# Patient Record
Sex: FEMALE | Race: WHITE | Employment: OTHER | URBAN - NONMETROPOLITAN AREA
[De-identification: names, ages, dates, MRNs, and addresses within clinical notes are randomized per-mention and may not be internally consistent; named-entity substitution may affect disease eponyms.]

---

## 2019-10-09 RX ORDER — METHYLPREDNISOLONE ACETATE 80 MG/ML
80 INJECTION, SUSPENSION INTRA-ARTICULAR; INTRALESIONAL; INTRAMUSCULAR; SOFT TISSUE ONCE
Status: CANCELLED | OUTPATIENT
Start: 2019-10-09 | End: 2019-10-09

## 2019-10-30 RX ORDER — METHYLPREDNISOLONE ACETATE 80 MG/ML
80 INJECTION, SUSPENSION INTRA-ARTICULAR; INTRALESIONAL; INTRAMUSCULAR; SOFT TISSUE ONCE
Status: CANCELLED | OUTPATIENT
Start: 2019-10-30 | End: 2019-10-30

## 2019-10-31 ENCOUNTER — HOSPITAL ENCOUNTER (OUTPATIENT)
Dept: INTERVENTIONAL RADIOLOGY/VASCULAR | Age: 64
Discharge: HOME | End: 2019-10-31
Payer: COMMERCIAL

## 2019-10-31 VITALS
DIASTOLIC BLOOD PRESSURE: 81 MMHG | OXYGEN SATURATION: 100 % | TEMPERATURE: 98.2 F | RESPIRATION RATE: 16 BRPM | HEART RATE: 73 BPM | WEIGHT: 123 LBS | SYSTOLIC BLOOD PRESSURE: 140 MMHG

## 2019-10-31 PROCEDURE — 6360000004 HC RX CONTRAST MEDICATION: Performed by: RADIOLOGY

## 2019-10-31 PROCEDURE — 2580000003 HC RX 258

## 2019-10-31 PROCEDURE — 62321 NJX INTERLAMINAR CRV/THRC: CPT | Performed by: RADIOLOGY

## 2019-10-31 PROCEDURE — 2500000003 HC RX 250 WO HCPCS

## 2019-10-31 PROCEDURE — 6360000002 HC RX W HCPCS

## 2019-10-31 PROCEDURE — 2709999900 HC NON-CHARGEABLE SUPPLY

## 2019-10-31 RX ORDER — BUSPIRONE HYDROCHLORIDE 10 MG/1
7.5 TABLET ORAL 2 TIMES DAILY
COMMUNITY

## 2019-10-31 RX ORDER — THIAMINE MONONITRATE (VIT B1) 100 MG
100 TABLET ORAL DAILY
COMMUNITY

## 2019-10-31 RX ORDER — CYCLOBENZAPRINE HCL 10 MG
10 TABLET ORAL 3 TIMES DAILY PRN
COMMUNITY

## 2019-10-31 RX ORDER — SUMATRIPTAN 6 MG/.5ML
6 INJECTION, SOLUTION SUBCUTANEOUS
COMMUNITY

## 2019-10-31 RX ORDER — METHYLPREDNISOLONE ACETATE 80 MG/ML
80 INJECTION, SUSPENSION INTRA-ARTICULAR; INTRALESIONAL; INTRAMUSCULAR; SOFT TISSUE ONCE
Status: COMPLETED | OUTPATIENT
Start: 2019-10-31 | End: 2019-10-31

## 2019-10-31 RX ORDER — TRAZODONE HYDROCHLORIDE 50 MG/1
50 TABLET ORAL NIGHTLY
COMMUNITY

## 2019-10-31 RX ORDER — M-VIT,TX,IRON,MINS/CALC/FOLIC 27MG-0.4MG
1 TABLET ORAL DAILY
COMMUNITY

## 2019-10-31 RX ORDER — FLUTICASONE PROPIONATE 50 MCG
1 SPRAY, SUSPENSION (ML) NASAL DAILY
COMMUNITY

## 2019-10-31 RX ORDER — OXYBUTYNIN CHLORIDE 15 MG/1
15 TABLET, EXTENDED RELEASE ORAL DAILY
COMMUNITY

## 2019-10-31 RX ORDER — TRAMADOL HYDROCHLORIDE 50 MG/1
50 TABLET ORAL EVERY 8 HOURS PRN
COMMUNITY

## 2019-10-31 RX ORDER — IBUPROFEN 600 MG/1
600 TABLET ORAL EVERY 8 HOURS PRN
COMMUNITY

## 2019-10-31 RX ORDER — CALCIUM CARBONATE 500(1250)
600 TABLET ORAL 2 TIMES DAILY
COMMUNITY

## 2019-10-31 RX ADMIN — IOHEXOL 1 ML: 180 INJECTION INTRAVENOUS at 13:24

## 2019-10-31 RX ADMIN — METHYLPREDNISOLONE ACETATE 80 MG: 80 INJECTION, SUSPENSION INTRA-ARTICULAR; INTRALESIONAL; INTRAMUSCULAR; SOFT TISSUE at 13:25

## 2019-10-31 RX ADMIN — Medication 1 ML: at 13:25

## 2019-10-31 ASSESSMENT — PAIN - FUNCTIONAL ASSESSMENT: PAIN_FUNCTIONAL_ASSESSMENT: 0-10

## 2019-10-31 ASSESSMENT — PAIN DESCRIPTION - ORIENTATION: ORIENTATION: LOWER;MID

## 2019-10-31 ASSESSMENT — PAIN DESCRIPTION - LOCATION: LOCATION: BACK

## 2019-10-31 ASSESSMENT — PAIN DESCRIPTION - PAIN TYPE: TYPE: CHRONIC PAIN

## 2019-10-31 ASSESSMENT — PAIN SCALES - GENERAL: PAINLEVEL_OUTOF10: 7

## 2020-10-07 RX ORDER — DEXAMETHASONE SODIUM PHOSPHATE 4 MG/ML
4 INJECTION, SOLUTION INTRA-ARTICULAR; INTRALESIONAL; INTRAMUSCULAR; INTRAVENOUS; SOFT TISSUE ONCE
Status: CANCELLED | OUTPATIENT
Start: 2020-10-07 | End: 2020-10-07

## 2020-10-08 ENCOUNTER — HOSPITAL ENCOUNTER (OUTPATIENT)
Dept: INTERVENTIONAL RADIOLOGY/VASCULAR | Age: 65
Discharge: HOME | End: 2020-10-08
Payer: MEDICARE

## 2020-10-08 VITALS
DIASTOLIC BLOOD PRESSURE: 65 MMHG | SYSTOLIC BLOOD PRESSURE: 127 MMHG | WEIGHT: 125.8 LBS | RESPIRATION RATE: 16 BRPM | HEART RATE: 67 BPM | TEMPERATURE: 97.6 F | OXYGEN SATURATION: 97 %

## 2020-10-08 PROCEDURE — 2709999900 HC NON-CHARGEABLE SUPPLY

## 2020-10-08 PROCEDURE — 6360000002 HC RX W HCPCS

## 2020-10-08 PROCEDURE — 6360000004 HC RX CONTRAST MEDICATION: Performed by: RADIOLOGY

## 2020-10-08 PROCEDURE — 2580000003 HC RX 258

## 2020-10-08 PROCEDURE — 62321 NJX INTERLAMINAR CRV/THRC: CPT | Performed by: RADIOLOGY

## 2020-10-08 RX ORDER — ASPIRIN 81 MG/1
81 TABLET ORAL DAILY
COMMUNITY

## 2020-10-08 RX ORDER — DEXAMETHASONE SODIUM PHOSPHATE 4 MG/ML
4 INJECTION, SOLUTION INTRA-ARTICULAR; INTRALESIONAL; INTRAMUSCULAR; INTRAVENOUS; SOFT TISSUE ONCE
Status: COMPLETED | OUTPATIENT
Start: 2020-10-08 | End: 2020-10-08

## 2020-10-08 RX ORDER — PREGABALIN 50 MG/1
50 CAPSULE ORAL 2 TIMES DAILY
COMMUNITY

## 2020-10-08 RX ADMIN — Medication 1 ML: at 13:46

## 2020-10-08 RX ADMIN — IOHEXOL 1 ML: 180 INJECTION INTRAVENOUS at 13:45

## 2020-10-08 RX ADMIN — DEXAMETHASONE SODIUM PHOSPHATE 4 MG: 4 INJECTION, SOLUTION INTRA-ARTICULAR; INTRALESIONAL; INTRAMUSCULAR; INTRAVENOUS; SOFT TISSUE at 13:46

## 2020-10-08 ASSESSMENT — PAIN DESCRIPTION - LOCATION: LOCATION: BACK;KNEE

## 2020-10-08 ASSESSMENT — PAIN SCALES - GENERAL
PAINLEVEL_OUTOF10: 2
PAINLEVEL_OUTOF10: 0
PAINLEVEL_OUTOF10: 5
PAINLEVEL_OUTOF10: 2

## 2020-10-08 ASSESSMENT — PAIN - FUNCTIONAL ASSESSMENT: PAIN_FUNCTIONAL_ASSESSMENT: 0-10

## 2020-10-08 ASSESSMENT — PAIN DESCRIPTION - ORIENTATION: ORIENTATION: LEFT

## 2020-10-08 NOTE — PROGRESS NOTES
1245  Pt ambulated into room with cane for nerve block cervical. Pt rights and responsibilities offered to patient and . Patient has been NPO for 4 hours and patient has held her aspirin for 5 days. 1357 pt arrived back to [facility] states no new pain, just the same pain she had. No new numbness nor tingling. Pt offered snack and beverate      1415 pt has no changes and no needs at this time  at bedside and is patient's . 1430  Patient ambulated out for discharge with her cane, discharge instructions explained, pt tolerated procedure well and verbalized understanding. No new needs at this time.                      __m__ Safety:       (Environmental)   [place] to environment   Ensure ID band is correct and in place/ allergy band as needed   Assess for fall risk   Initiate fall precautions as applicable (fall band, side rails, etc.)   Call light within reach   Bed in low position/ wheels locked    _m___ Pain:        Assess pain level and characteristics   Administer analgesics as ordered   Assess effectiveness of pain management and report to MD as needed    __m__ Knowledge Deficit:   Assess baseline knowledge   Provide teaching at level of understanding   Provide teaching via preferred learning method   Evaluate teaching effectiveness    _m___ Hemodynamic/Respiratory Status:       (Pre and Post Procedure Monitoring)   Assess/Monitor vital signs and LOC   Assess Baseline SpO2 prior to any sedation   Obtain weight/height   Assess vital signs/ LOC until patient meets discharge criteria   Monitor procedure site and notify MD of any issues

## 2020-10-08 NOTE — H&P
Formulation and discussion of sedation / procedure plans, risks, benefits, side effects and alternatives with patient and/or responsible adult completed.     Electronically signed by Mali Harris MD on 10/8/2020 at 1:47 PM

## 2020-10-08 NOTE — OP NOTE
Department of Radiology  Post Procedure Progress Note      Pre-Procedure Diagnosis:  Cervical radiculopathy     Procedure Performed:  Epidural block/steroid injection      Anesthesia: local     Findings: successful    Immediate Complications:  None    Estimated Blood Loss: minimal    SEE DICTATED PROCEDURE NOTE FOR COMPLETE DETAILS.       Katherine Smith MD   10/8/2020 1:47 PM

## 2024-03-20 RX ORDER — OMEGA-3S/DHA/EPA/FISH OIL/D3 300MG-1000
400 CAPSULE ORAL DAILY
COMMUNITY

## 2024-03-20 RX ORDER — HYDROCODONE BITARTRATE AND ACETAMINOPHEN 5; 325 MG/1; MG/1
1 TABLET ORAL EVERY 6 HOURS PRN
Status: ON HOLD | COMMUNITY
Start: 2024-03-16 | End: 2024-03-21 | Stop reason: HOSPADM

## 2024-03-20 RX ORDER — PRAVASTATIN SODIUM 40 MG
40 TABLET ORAL NIGHTLY
COMMUNITY
Start: 2020-05-27

## 2024-03-20 RX ORDER — CELECOXIB 100 MG/1
100 CAPSULE ORAL 2 TIMES DAILY
COMMUNITY
Start: 2024-01-29 | End: 2024-04-28

## 2024-03-20 RX ORDER — DOXYCYCLINE HYCLATE 50 MG/1
30 CAPSULE ORAL DAILY
COMMUNITY
Start: 2015-03-31

## 2024-03-20 RX ORDER — CALCIUM CARBONATE 300MG(750)
400 TABLET,CHEWABLE ORAL NIGHTLY
COMMUNITY
Start: 2023-07-31

## 2024-03-20 RX ORDER — CLONAZEPAM 0.5 MG/1
0.5 TABLET, ORALLY DISINTEGRATING ORAL 3 TIMES DAILY PRN
COMMUNITY
Start: 2023-10-25

## 2024-03-20 NOTE — PROGRESS NOTES
Left message for CHLOE Zheng of Dr. Das - requesting for all pre-op testing results be faxed to PAT.  Await response.

## 2024-03-20 NOTE — PROGRESS NOTES
Left message for ANUPAMA [facility] Records - requesting for all pre-op testing results to be faxed to PAT.  Await response.

## 2024-03-21 ENCOUNTER — HOSPITAL ENCOUNTER (OUTPATIENT)
Age: 69
Discharge: HOME | End: 2024-03-22
Attending: ORTHOPAEDIC SURGERY | Admitting: ORTHOPAEDIC SURGERY
Payer: MEDICARE

## 2024-03-21 ENCOUNTER — APPOINTMENT (OUTPATIENT)
Dept: GENERAL RADIOLOGY | Age: 69
End: 2024-03-21
Attending: ORTHOPAEDIC SURGERY
Payer: MEDICARE

## 2024-03-21 ENCOUNTER — ANESTHESIA EVENT (OUTPATIENT)
Dept: OPERATING ROOM | Age: 69
End: 2024-03-21
Payer: MEDICARE

## 2024-03-21 ENCOUNTER — ANESTHESIA (OUTPATIENT)
Dept: OPERATING ROOM | Age: 69
End: 2024-03-21
Payer: MEDICARE

## 2024-03-21 DIAGNOSIS — G89.18 POSTOPERATIVE PAIN: Primary | ICD-10-CM

## 2024-03-21 PROBLEM — S42.402A CLOSED FRACTURE OF DISTAL END OF LEFT HUMERUS, UNSPECIFIED FRACTURE MORPHOLOGY, INITIAL ENCOUNTER: Status: ACTIVE | Noted: 2024-03-21

## 2024-03-21 PROCEDURE — 2500000003 HC RX 250 WO HCPCS: Performed by: NURSE ANESTHETIST, CERTIFIED REGISTERED

## 2024-03-21 PROCEDURE — 6360000002 HC RX W HCPCS: Performed by: NURSE ANESTHETIST, CERTIFIED REGISTERED

## 2024-03-21 PROCEDURE — 2580000003 HC RX 258: Performed by: PHYSICIAN ASSISTANT

## 2024-03-21 PROCEDURE — 3600000014 HC SURGERY LEVEL 4 ADDTL 15MIN: Performed by: ORTHOPAEDIC SURGERY

## 2024-03-21 PROCEDURE — 7100000000 HC PACU RECOVERY - FIRST 15 MIN: Performed by: ORTHOPAEDIC SURGERY

## 2024-03-21 PROCEDURE — 2709999900 HC NON-CHARGEABLE SUPPLY: Performed by: ORTHOPAEDIC SURGERY

## 2024-03-21 PROCEDURE — 2720000010 HC SURG SUPPLY STERILE: Performed by: ORTHOPAEDIC SURGERY

## 2024-03-21 PROCEDURE — 73060 X-RAY EXAM OF HUMERUS: CPT

## 2024-03-21 PROCEDURE — 2780000010 HC IMPLANT OTHER: Performed by: ORTHOPAEDIC SURGERY

## 2024-03-21 PROCEDURE — 3600000004 HC SURGERY LEVEL 4 BASE: Performed by: ORTHOPAEDIC SURGERY

## 2024-03-21 PROCEDURE — 2580000003 HC RX 258: Performed by: NURSE ANESTHETIST, CERTIFIED REGISTERED

## 2024-03-21 PROCEDURE — 64415 NJX AA&/STRD BRCH PLXS IMG: CPT | Performed by: ANESTHESIOLOGY

## 2024-03-21 PROCEDURE — 6360000002 HC RX W HCPCS: Performed by: PHYSICIAN ASSISTANT

## 2024-03-21 PROCEDURE — 6360000002 HC RX W HCPCS: Performed by: ANESTHESIOLOGY

## 2024-03-21 PROCEDURE — 3700000000 HC ANESTHESIA ATTENDED CARE: Performed by: ORTHOPAEDIC SURGERY

## 2024-03-21 PROCEDURE — C1713 ANCHOR/SCREW BN/BN,TIS/BN: HCPCS | Performed by: ORTHOPAEDIC SURGERY

## 2024-03-21 PROCEDURE — 7100000001 HC PACU RECOVERY - ADDTL 15 MIN: Performed by: ORTHOPAEDIC SURGERY

## 2024-03-21 PROCEDURE — 3700000001 HC ADD 15 MINUTES (ANESTHESIA): Performed by: ORTHOPAEDIC SURGERY

## 2024-03-21 DEVICE — EVOS 2.7MM X 34MM LOCKING SCREW T8 SELF-TAPPING
Type: IMPLANTABLE DEVICE | Site: ARM | Status: FUNCTIONAL
Brand: EVOS

## 2024-03-21 DEVICE — EVOS 3.5MM X 18MM CORTEX SCREW SELF-TAPPING
Type: IMPLANTABLE DEVICE | Site: ARM | Status: FUNCTIONAL
Brand: EVOS

## 2024-03-21 DEVICE — EVOS 3.5MM X 20MM CORTEX SCREW SELF-TAPPING
Type: IMPLANTABLE DEVICE | Site: ARM | Status: FUNCTIONAL
Brand: EVOS

## 2024-03-21 DEVICE — EVOS 2.7MM X 55MM LOCKING SCREW T8 SELF-TAPPING
Type: IMPLANTABLE DEVICE | Site: ARM | Status: FUNCTIONAL
Brand: EVOS

## 2024-03-21 DEVICE — EVOS 2.7MM X 46MM LOCKING SCREW T8 SELF-TAPPING
Type: IMPLANTABLE DEVICE | Site: ARM | Status: FUNCTIONAL
Brand: EVOS

## 2024-03-21 DEVICE — EVOS 4.7MM X 28MM OSTEOPENIA SCREW                                    FULLY THREADED
Type: IMPLANTABLE DEVICE | Site: ARM | Status: FUNCTIONAL
Brand: EVOS

## 2024-03-21 DEVICE — PERI-LOC K-WIRE 1.6MM X 150MM                                    LENGTH TROCAR POINT
Type: IMPLANTABLE DEVICE | Site: ARM | Status: FUNCTIONAL
Brand: PERI-LOC

## 2024-03-21 DEVICE — EVOS 3.5MM X 30MM LOCKING SCREW SELF-TAPPING
Type: IMPLANTABLE DEVICE | Site: ARM | Status: FUNCTIONAL
Brand: EVOS

## 2024-03-21 DEVICE — EVOS 3.5MM X 22MM LOCKING SCREW SELF-TAPPING
Type: IMPLANTABLE DEVICE | Site: ARM | Status: FUNCTIONAL
Brand: EVOS

## 2024-03-21 DEVICE — EVOS 4.0MM X 28MM OSTEOPENIA SCREW                                    T8 FULLY THREADED
Type: IMPLANTABLE DEVICE | Site: ARM | Status: FUNCTIONAL
Brand: EVOS

## 2024-03-21 DEVICE — EVOS 2.7MM X 42MM LOCKING SCREW T8 SELF-TAPPING
Type: IMPLANTABLE DEVICE | Site: ARM | Status: FUNCTIONAL
Brand: EVOS

## 2024-03-21 DEVICE — PERI-LOC K-WIRE 2.0MM X 150MM                                    LENGTH TROCAR POINT
Type: IMPLANTABLE DEVICE | Site: ARM | Status: FUNCTIONAL
Brand: PERI-LOC

## 2024-03-21 DEVICE — EVOS 2.7MM X 38MM LOCKING SCREW T8 SELF-TAPPING
Type: IMPLANTABLE DEVICE | Site: ARM | Status: FUNCTIONAL
Brand: EVOS

## 2024-03-21 DEVICE — EVOS 3.5MM X 20MM LOCKING SCREW SELF-TAPPING
Type: IMPLANTABLE DEVICE | Site: ARM | Status: FUNCTIONAL
Brand: EVOS

## 2024-03-21 DEVICE — EVOS 3.5MM X 18MM LOCKING SCREW SELF-TAPPING
Type: IMPLANTABLE DEVICE | Site: ARM | Status: FUNCTIONAL
Brand: EVOS

## 2024-03-21 DEVICE — EVOS 3.5MM X 24MM LOCKING SCREW SELF-TAPPING
Type: IMPLANTABLE DEVICE | Site: ARM | Status: FUNCTIONAL
Brand: EVOS

## 2024-03-21 RX ORDER — SODIUM CHLORIDE 0.9 % (FLUSH) 0.9 %
5-40 SYRINGE (ML) INJECTION EVERY 12 HOURS SCHEDULED
Status: DISCONTINUED | OUTPATIENT
Start: 2024-03-21 | End: 2024-03-22 | Stop reason: HOSPADM

## 2024-03-21 RX ORDER — SODIUM CHLORIDE 9 MG/ML
INJECTION, SOLUTION INTRAVENOUS PRN
Status: DISCONTINUED | OUTPATIENT
Start: 2024-03-21 | End: 2024-03-22 | Stop reason: HOSPADM

## 2024-03-21 RX ORDER — DEXAMETHASONE SODIUM PHOSPHATE 4 MG/ML
INJECTION, SOLUTION INTRA-ARTICULAR; INTRALESIONAL; INTRAMUSCULAR; INTRAVENOUS; SOFT TISSUE
Status: COMPLETED | OUTPATIENT
Start: 2024-03-21 | End: 2024-03-21

## 2024-03-21 RX ORDER — FLUTICASONE PROPIONATE 50 MCG
1 SPRAY, SUSPENSION (ML) NASAL DAILY
Status: DISCONTINUED | OUTPATIENT
Start: 2024-03-22 | End: 2024-03-22 | Stop reason: HOSPADM

## 2024-03-21 RX ORDER — HYDROCODONE BITARTRATE AND ACETAMINOPHEN 5; 325 MG/1; MG/1
1 TABLET ORAL EVERY 4 HOURS PRN
Qty: 30 TABLET | Refills: 0 | Status: SHIPPED | OUTPATIENT
Start: 2024-03-21 | End: 2024-03-26

## 2024-03-21 RX ORDER — PRAVASTATIN SODIUM 40 MG
40 TABLET ORAL NIGHTLY
Status: DISCONTINUED | OUTPATIENT
Start: 2024-03-21 | End: 2024-03-22 | Stop reason: HOSPADM

## 2024-03-21 RX ORDER — ONDANSETRON 4 MG/1
4 TABLET, ORALLY DISINTEGRATING ORAL EVERY 8 HOURS PRN
Status: DISCONTINUED | OUTPATIENT
Start: 2024-03-21 | End: 2024-03-22 | Stop reason: HOSPADM

## 2024-03-21 RX ORDER — FENTANYL CITRATE 50 UG/ML
INJECTION, SOLUTION INTRAMUSCULAR; INTRAVENOUS PRN
Status: DISCONTINUED | OUTPATIENT
Start: 2024-03-21 | End: 2024-03-21 | Stop reason: SDUPTHER

## 2024-03-21 RX ORDER — FENTANYL CITRATE 50 UG/ML
50 INJECTION, SOLUTION INTRAMUSCULAR; INTRAVENOUS EVERY 5 MIN PRN
Status: DISCONTINUED | OUTPATIENT
Start: 2024-03-21 | End: 2024-03-21 | Stop reason: HOSPADM

## 2024-03-21 RX ORDER — FENTANYL CITRATE 50 UG/ML
25 INJECTION, SOLUTION INTRAMUSCULAR; INTRAVENOUS EVERY 5 MIN PRN
Status: DISCONTINUED | OUTPATIENT
Start: 2024-03-21 | End: 2024-03-21 | Stop reason: HOSPADM

## 2024-03-21 RX ORDER — CELECOXIB 100 MG/1
100 CAPSULE ORAL 2 TIMES DAILY
Status: DISCONTINUED | OUTPATIENT
Start: 2024-03-21 | End: 2024-03-22 | Stop reason: HOSPADM

## 2024-03-21 RX ORDER — ROCURONIUM BROMIDE 10 MG/ML
INJECTION, SOLUTION INTRAVENOUS PRN
Status: DISCONTINUED | OUTPATIENT
Start: 2024-03-21 | End: 2024-03-21 | Stop reason: SDUPTHER

## 2024-03-21 RX ORDER — MULTIVITAMIN WITH IRON
1 TABLET ORAL DAILY
Status: DISCONTINUED | OUTPATIENT
Start: 2024-03-21 | End: 2024-03-22 | Stop reason: HOSPADM

## 2024-03-21 RX ORDER — HYDROCODONE BITARTRATE AND ACETAMINOPHEN 5; 325 MG/1; MG/1
2 TABLET ORAL EVERY 4 HOURS PRN
Status: DISCONTINUED | OUTPATIENT
Start: 2024-03-21 | End: 2024-03-22 | Stop reason: HOSPADM

## 2024-03-21 RX ORDER — CLONAZEPAM 0.5 MG/1
0.5 TABLET ORAL 3 TIMES DAILY PRN
Status: DISCONTINUED | OUTPATIENT
Start: 2024-03-21 | End: 2024-03-22 | Stop reason: HOSPADM

## 2024-03-21 RX ORDER — SODIUM CHLORIDE 0.9 % (FLUSH) 0.9 %
5-40 SYRINGE (ML) INJECTION PRN
Status: DISCONTINUED | OUTPATIENT
Start: 2024-03-21 | End: 2024-03-21 | Stop reason: HOSPADM

## 2024-03-21 RX ORDER — ONDANSETRON 2 MG/ML
INJECTION INTRAMUSCULAR; INTRAVENOUS PRN
Status: DISCONTINUED | OUTPATIENT
Start: 2024-03-21 | End: 2024-03-21 | Stop reason: SDUPTHER

## 2024-03-21 RX ORDER — ONDANSETRON 2 MG/ML
4 INJECTION INTRAMUSCULAR; INTRAVENOUS EVERY 6 HOURS PRN
Status: DISCONTINUED | OUTPATIENT
Start: 2024-03-21 | End: 2024-03-22 | Stop reason: HOSPADM

## 2024-03-21 RX ORDER — SODIUM CHLORIDE 9 MG/ML
INJECTION, SOLUTION INTRAVENOUS PRN
Status: DISCONTINUED | OUTPATIENT
Start: 2024-03-21 | End: 2024-03-21 | Stop reason: HOSPADM

## 2024-03-21 RX ORDER — PROPOFOL 10 MG/ML
INJECTION, EMULSION INTRAVENOUS PRN
Status: DISCONTINUED | OUTPATIENT
Start: 2024-03-21 | End: 2024-03-21 | Stop reason: SDUPTHER

## 2024-03-21 RX ORDER — SODIUM CHLORIDE 9 MG/ML
INJECTION, SOLUTION INTRAVENOUS CONTINUOUS PRN
Status: DISCONTINUED | OUTPATIENT
Start: 2024-03-21 | End: 2024-03-21 | Stop reason: SDUPTHER

## 2024-03-21 RX ORDER — HYDROCODONE BITARTRATE AND ACETAMINOPHEN 5; 325 MG/1; MG/1
1 TABLET ORAL EVERY 4 HOURS PRN
Status: DISCONTINUED | OUTPATIENT
Start: 2024-03-21 | End: 2024-03-22 | Stop reason: HOSPADM

## 2024-03-21 RX ORDER — SODIUM CHLORIDE 0.9 % (FLUSH) 0.9 %
5-40 SYRINGE (ML) INJECTION EVERY 12 HOURS SCHEDULED
Status: DISCONTINUED | OUTPATIENT
Start: 2024-03-21 | End: 2024-03-21 | Stop reason: HOSPADM

## 2024-03-21 RX ORDER — TRAZODONE HYDROCHLORIDE 50 MG/1
50 TABLET ORAL NIGHTLY
Status: DISCONTINUED | OUTPATIENT
Start: 2024-03-21 | End: 2024-03-22 | Stop reason: HOSPADM

## 2024-03-21 RX ORDER — DEXAMETHASONE SODIUM PHOSPHATE 10 MG/ML
INJECTION, EMULSION INTRAMUSCULAR; INTRAVENOUS PRN
Status: DISCONTINUED | OUTPATIENT
Start: 2024-03-21 | End: 2024-03-21 | Stop reason: SDUPTHER

## 2024-03-21 RX ORDER — SODIUM CHLORIDE 9 MG/ML
INJECTION, SOLUTION INTRAVENOUS CONTINUOUS
Status: DISCONTINUED | OUTPATIENT
Start: 2024-03-21 | End: 2024-03-21 | Stop reason: HOSPADM

## 2024-03-21 RX ORDER — LANOLIN ALCOHOL/MO/W.PET/CERES
400 CREAM (GRAM) TOPICAL NIGHTLY
Status: DISCONTINUED | OUTPATIENT
Start: 2024-03-21 | End: 2024-03-22 | Stop reason: HOSPADM

## 2024-03-21 RX ORDER — LIDOCAINE HCL/PF 100 MG/5ML
SYRINGE (ML) INJECTION PRN
Status: DISCONTINUED | OUTPATIENT
Start: 2024-03-21 | End: 2024-03-21 | Stop reason: SDUPTHER

## 2024-03-21 RX ORDER — BUPIVACAINE HYDROCHLORIDE 5 MG/ML
INJECTION, SOLUTION EPIDURAL; INTRACAUDAL
Status: COMPLETED | OUTPATIENT
Start: 2024-03-21 | End: 2024-03-21

## 2024-03-21 RX ORDER — MIDAZOLAM HYDROCHLORIDE 1 MG/ML
INJECTION INTRAMUSCULAR; INTRAVENOUS PRN
Status: DISCONTINUED | OUTPATIENT
Start: 2024-03-21 | End: 2024-03-21 | Stop reason: SDUPTHER

## 2024-03-21 RX ORDER — SODIUM CHLORIDE 0.9 % (FLUSH) 0.9 %
5-40 SYRINGE (ML) INJECTION PRN
Status: DISCONTINUED | OUTPATIENT
Start: 2024-03-21 | End: 2024-03-22 | Stop reason: HOSPADM

## 2024-03-21 RX ORDER — DULOXETIN HYDROCHLORIDE 30 MG/1
30 CAPSULE, DELAYED RELEASE ORAL DAILY
COMMUNITY

## 2024-03-21 RX ORDER — TRANEXAMIC ACID 100 MG/ML
INJECTION, SOLUTION INTRAVENOUS PRN
Status: DISCONTINUED | OUTPATIENT
Start: 2024-03-21 | End: 2024-03-21 | Stop reason: SDUPTHER

## 2024-03-21 RX ORDER — NALOXONE HYDROCHLORIDE 0.4 MG/ML
INJECTION, SOLUTION INTRAMUSCULAR; INTRAVENOUS; SUBCUTANEOUS PRN
Status: DISCONTINUED | OUTPATIENT
Start: 2024-03-21 | End: 2024-03-21 | Stop reason: HOSPADM

## 2024-03-21 RX ADMIN — DEXAMETHASONE SODIUM PHOSPHATE 10 MG: 4 INJECTION, SOLUTION INTRA-ARTICULAR; INTRALESIONAL; INTRAMUSCULAR; INTRAVENOUS; SOFT TISSUE at 13:40

## 2024-03-21 RX ADMIN — ROCURONIUM BROMIDE 50 MG: 10 INJECTION INTRAVENOUS at 14:52

## 2024-03-21 RX ADMIN — SUGAMMADEX 200 MG: 100 INJECTION, SOLUTION INTRAVENOUS at 18:50

## 2024-03-21 RX ADMIN — ONDANSETRON 4 MG: 2 INJECTION INTRAMUSCULAR; INTRAVENOUS at 14:52

## 2024-03-21 RX ADMIN — FENTANYL CITRATE 50 MCG: 50 INJECTION, SOLUTION INTRAMUSCULAR; INTRAVENOUS at 18:17

## 2024-03-21 RX ADMIN — WATER 2000 MG: 1 INJECTION INTRAMUSCULAR; INTRAVENOUS; SUBCUTANEOUS at 22:39

## 2024-03-21 RX ADMIN — WATER 2000 MG: 1 INJECTION INTRAMUSCULAR; INTRAVENOUS; SUBCUTANEOUS at 15:00

## 2024-03-21 RX ADMIN — MIDAZOLAM 2 MG: 1 INJECTION INTRAMUSCULAR; INTRAVENOUS at 13:40

## 2024-03-21 RX ADMIN — SODIUM CHLORIDE: 9 INJECTION, SOLUTION INTRAVENOUS at 18:14

## 2024-03-21 RX ADMIN — FENTANYL CITRATE 50 MCG: 50 INJECTION, SOLUTION INTRAMUSCULAR; INTRAVENOUS at 18:24

## 2024-03-21 RX ADMIN — SODIUM CHLORIDE: 9 INJECTION, SOLUTION INTRAVENOUS at 14:48

## 2024-03-21 RX ADMIN — PROPOFOL 200 MG: 10 INJECTION, EMULSION INTRAVENOUS at 14:52

## 2024-03-21 RX ADMIN — SODIUM CHLORIDE: 9 INJECTION, SOLUTION INTRAVENOUS at 16:06

## 2024-03-21 RX ADMIN — ROCURONIUM BROMIDE 20 MG: 10 INJECTION INTRAVENOUS at 17:41

## 2024-03-21 RX ADMIN — ONDANSETRON 4 MG: 2 INJECTION INTRAMUSCULAR; INTRAVENOUS at 20:17

## 2024-03-21 RX ADMIN — FENTANYL CITRATE 100 MCG: 50 INJECTION, SOLUTION INTRAMUSCULAR; INTRAVENOUS at 14:52

## 2024-03-21 RX ADMIN — DEXAMETHASONE SODIUM PHOSPHATE 10 MG: 10 INJECTION, EMULSION INTRAMUSCULAR; INTRAVENOUS at 14:52

## 2024-03-21 RX ADMIN — TRANEXAMIC ACID 1000 MG: 100 INJECTION, SOLUTION INTRAVENOUS at 17:35

## 2024-03-21 RX ADMIN — Medication 100 MG: at 14:52

## 2024-03-21 RX ADMIN — BUPIVACAINE HYDROCHLORIDE 30 ML: 5 INJECTION, SOLUTION EPIDURAL; INTRACAUDAL; PERINEURAL at 13:40

## 2024-03-21 RX ADMIN — SODIUM CHLORIDE: 9 INJECTION, SOLUTION INTRAVENOUS at 13:13

## 2024-03-21 ASSESSMENT — PAIN - FUNCTIONAL ASSESSMENT
PAIN_FUNCTIONAL_ASSESSMENT: 0-10
PAIN_FUNCTIONAL_ASSESSMENT: NONE - DENIES PAIN

## 2024-03-21 NOTE — PROGRESS NOTES
1335: Patient transported to PACU for left upper extremity block.   Block to be performed by Dr. Anatoly Cardoza.   Persons in addition to physician assisting in block: Hannah Antoine RN  Consent confirmed complete and signed by patient, anesthesia provider and a RN.     Time out performed and documented in Time Out section under the PACU tab.  Block started at: *1340  Block completed at: 1345    Medications administered during block procedure: Versed    Pre, intra, and post-block procedure vitals charted under the flowsheet.     Patient accompanied and monitored by this PACU RN until care was assumed by the OR team.

## 2024-03-21 NOTE — ANESTHESIA PROCEDURE NOTES
Peripheral Block    Patient location during procedure: procedure area  Reason for block: at surgeon's request  Start time: 3/21/2024 1:40 PM  End time: 3/21/2024 1:45 PM  Staffing  Performed: anesthesiologist   Anesthesiologist: Anatoly Cardoza DO  Performed by: Anatoly Cardoza DO  Authorized by: Anatoly Cardoza DO    Preanesthetic Checklist  Completed: patient identified, IV checked, site marked, risks and benefits discussed, surgical/procedural consents, equipment checked, pre-op evaluation, timeout performed, anesthesia consent given, oxygen available, monitors applied/VS acknowledged, fire risk safety assessment completed and verbalized and blood product R/B/A discussed and consented  Peripheral Block   Patient position: sitting  Prep: ChloraPrep  Provider prep: mask and sterile gloves  Patient monitoring: IV access, responsive to questions and continuous pulse ox  Block type: Brachial plexus  Supraclavicular  Laterality: left  Injection technique: single-shot  Guidance: ultrasound guided    Needle   Needle type: short-bevel   Needle gauge: 20 G  Needle localization: ultrasound guidance  Needle length: 10 cm  Assessment   Injection assessment: negative aspiration for heme and local visualized surrounding nerve on ultrasound  Paresthesia pain: immediately resolved  Slow fractionated injection: yes  Hemodynamics: stable  Outcomes: patient tolerated procedure well    Medications Administered  BUPivacaine (MARCAINE) PF injection 0.5% - Perineural   30 mL - 3/21/2024 1:40:00 PM  dexAMETHasone (DECADRON) injection 4 mg/mL - Perineural   10 mg - 3/21/2024 1:40:00 PM

## 2024-03-21 NOTE — PROGRESS NOTES
1920: Pt arrives to pacu, awakens to verbal stimuli. Pt on room air, respirations easy and unlabored. Currently denies pain or nausea. VSS  1935: Pt resting off and on with eyes closed, easily awakens to voice. Continues to deny pain.  1940: Report given to Leticia on 6A  1950: Pt meets criteria for discharge from pacu, awaiting transport  1952:Pt transported to 6A in stable condition. VSS

## 2024-03-21 NOTE — DISCHARGE INSTRUCTIONS
Orthopedic Discharge Instructions:  Weight bearing status: No lifting, pushing or pulling for the left arm. Keep dressing clean and dry.  Do not remove dressings or splint  Ice (20 minutes on and off 1 hour) and elevate as needed to reduce swelling and throbbing pain.  If Dressing allowed to be removed, Starting 3 days after surgery, if wound is no longer leaking, Ok to shower but no soaks or baths.  Advance diet as tolerated: begin with clear liquids.  Drink plenty of fluids.  Call the office or come to Emergency Room if signs of infection appear (hot, swollen, red, draining pus, fever)  Take medications as prescribed.  Wean off narcotics (percocet/norco) as soon as possible. Do not take tylenol if still taking narcotics.  Follow up with Dr. Das in his office in 10-14 days after surgery. Call [phone] ext [phone] to schedule/confirm.

## 2024-03-21 NOTE — ANESTHESIA POSTPROCEDURE EVALUATION
Department of Anesthesiology  Postprocedure Note    Patient: Ashley Burt  MRN: [id number]  YOB: 1955  Date of evaluation: 3/21/2024    Procedure Summary       Date: 03/21/24 Room / Location: [facility] OR 08 / [facility] OR    Anesthesia Start: 1446 Anesthesia Stop: 1919    Procedure: Left Distal Humerus ORIF (Arm Upper) Diagnosis:       Closed fracture of distal end of left humerus, unspecified fracture morphology, initial encounter      (Closed fracture of distal end of left humerus, unspecified fracture morphology, initial encounter [S42.402A])    Surgeons: Manav Das DO Responsible Provider: Anatoly Cardoza DO    Anesthesia Type: general, regional ASA Status: 2            Anesthesia Type: No value filed.    Ousmane Phase I: Ousmane Score: 9    Ousmane Phase II:      Anesthesia Post Evaluation    Patient location during evaluation: PACU  Patient participation: complete - patient participated  Level of consciousness: awake  Airway patency: patent  Nausea & Vomiting: no nausea  Cardiovascular status: hemodynamically stable  Respiratory status: acceptable  Hydration status: stable  Pain management: adequate    No notable events documented.

## 2024-03-21 NOTE — BRIEF OP NOTE
Brief Postoperative Note      Patient: Ashley Burt  YOB: 1955  MRN: [id number]    Date of Procedure: 3/21/2024    Pre-Op Diagnosis Codes:     * Closed fracture of distal end of left humerus, unspecified fracture morphology, initial encounter [S42.402A]    Post-Op Diagnosis: Same       Procedure(s):  Left Distal Humerus ORIF    Surgeon(s):  Manav Das DO    Assistant:  Physician Assistant: Freddy Choi PA-C    Anesthesia: General    Estimated Blood Loss (mL): 300 cc    Complications: None    Specimens:   * No specimens in log *    Implants:  Implant Name Type Inv. Item Serial No.  Lot No. LRB No. Used Action   K WIRE FIX L150MM DIA2MM S STL TRCR TIP DISP FOR EVOS MINI - [id number]  K WIRE FIX L150MM DIA2MM S STL TRCR TIP DISP FOR EVOS MINI  [facility]  N/A 1 Implanted   K WIRE FIX L150MM DIA1.6MM TRCR PNT FOR ARLEEN-LOC SYDNEE ANG - [id number]  K WIRE FIX L150MM DIA1.6MM TRCR PNT FOR ARLEEN-LOC SYDNEE ANG  [place] [employer] [facility]  N/A 1 Implanted   SCREW BONE L18MM DIA3.5MM STRL TIMUR S STL ST FOR SM PLATING - [id number]  SCREW BONE L18MM DIA3.5MM STRL TIMUR S STL ST FOR SM PLATING  [facility]  N/A 1 Implanted   SCREW BONE L20MM DIA3.5MM STRL TIMUR S STL ST FOR SM PLATING - [id number]  SCREW BONE L20MM DIA3.5MM STRL TIMUR S STL ST FOR SM PLATING  [employer] [facility]  N/A 1 Implanted   SCREW BONE L18MM DIA3.5MM STRL TIMUR S STL ST FOR SM PLATING - [id number]  SCREW BONE L18MM DIA3.5MM STRL TIMUR S STL ST FOR SM PLATING  [employer] [facility]  N/A 1 Implanted   SCREW BONE L22MM DIA3.5MM STRL S STL LCK ST FOR SM PLATING - [id number]  SCREW BONE L22MM DIA3.5MM STRL S STL LCK ST FOR SM PLATING  [employer] [facility]  N/A 1 Implanted   SCREW BONE L30MM DIA3.5MM STRL S STL LCK ST FOR SM PLATING - [id number]  SCREW BONE L30MM DIA3.5MM STRL S STL LCK ST FOR SM PLATING  [employer] [employer]-  N/A 1  Implanted   SCREW BONE L18MM DIA3.5MM STRL S STL LCK ST FOR SM PLATING - [id number]  SCREW BONE L18MM DIA3.5MM STRL S STL LCK ST FOR SM PLATING  [employer]  N/A 1 Implanted   SCREW BONE L55MM THRD DIA2.7MM HD DIA4.3MM COR DIA2MM PITCH - [id number]  SCREW BONE L55MM THRD DIA2.7MM HD DIA4.3MM COR DIA2MM PITCH  [employer]  N/A 1 Implanted   SCREW BONE L34MM THRD DIA2.7MM HD DIA4.3MM COR DIA2MM PITCH - [id number]  SCREW BONE L34MM THRD DIA2.7MM HD DIA4.3MM COR DIA2MM PITCH  [employer]  N/A 1 Implanted   SCREW BONE L46MM THRD DIA2.7MM HD DIA4.3MM COR DIA2MM PITCH - [id number]  SCREW BONE L46MM THRD DIA2.7MM HD DIA4.3MM COR DIA2MM PITCH  [employer]  N/A 1 Implanted   EVOS DISTAL HUMERUS PLATE -LEFT LATERAL      N/A 1 Implanted   EVOS DISTAL HUMERUS PLATE -LEFT MEDIAL      N/A 1 Implanted         Drains: * No LDAs found *    Findings: Postop diagnosis confirmed      Electronically signed by Freddy Choi PA-C on 3/21/2024 at 7:12 PM

## 2024-03-21 NOTE — ANESTHESIA PRE PROCEDURE
Department of Anesthesiology  Preprocedure Note       Name:  Ashley Burt   Age:  68 y.o.  :  1955                                          MRN:  [id number]         Date:  3/21/2024      Surgeon: Surgeon(s):  Manav Das DO    Procedure: Procedure(s):  Left Distal Humerus ORIF    Medications prior to admission:   Prior to Admission medications    Medication Sig Start Date End Date Taking? Authorizing Provider   Magnesium 400 MG TABS Take 400 mg by mouth nightly 23  Yes Aleksandra Whitley MD   HYDROcodone-acetaminophen (NORCO) 5-325 MG per tablet Take 1 tablet by mouth every 6 hours as needed. 3/16/24  Yes Aleksandra Whitley MD   clonazePAM (KLONOPIN) 0.5 MG disintegrating tablet Take 1 tablet by mouth 3 times daily as needed. 10/25/23  Yes Aleksandra Whitley MD   celecoxib (CELEBREX) 100 MG capsule Take 1 capsule by mouth 2 times daily 24 Yes Aleksandra Whitley MD   doxycycline (VIBRAMYCIN) 50 MG capsule Take 30 mg by mouth daily  Patient not taking: Reported on 3/21/2024 3/31/15  Yes Aleksandra Whitley MD   pravastatin (PRAVACHOL) 40 MG tablet Take 1 tablet by mouth nightly 20  Yes Aleksandra Whitley MD   cholecalciferol (VITAMIN D3) 400 UNIT TABS tablet Take 1 tablet by mouth daily  Patient not taking: Reported on 3/21/2024   Yes Aleksandra Whitley MD   L-Lysine 1000 MG TABS Take 500 mg by mouth daily    Aleksandra Whitley MD   traZODone (DESYREL) 50 MG tablet Take 1 tablet by mouth nightly    Aleksandra Whitley MD   SUMAtriptan (IMITREX) 6 MG/0.5ML SOLN injection Inject 0.5 mLs into the skin once as needed for Migraine  Patient not taking: Reported on 3/21/2024    Aleksandra Whitley MD   fluticasone (FLONASE) 50 MCG/ACT nasal spray 1 spray by Each Nostril route daily    Aleksandra Whitley MD   vitamin B-1 (THIAMINE) 100 MG tablet Take 1 tablet by mouth daily  Patient not taking: Reported on 3/21/2024    Aleksandra Whitley MD   Multiple

## 2024-03-21 NOTE — H&P
Orthopedic H&P      CHIEF COMPLAINT: Left elbow    HISTORY OF PRESENT ILLNESS:      The patient is a 68 y.o. female with a left distal humerus fracture.  Here today for surgical intervention.    Past Medical History:    Past Medical History:   Diagnosis Date    A-fib (HCC)     Anxiety     Arthritis     \"my entire body hurts\"    Cancer (HCC)     melanoma right arm, was cut out no txt    Hyperlipidemia        Past Surgical History:    Past Surgical History:   Procedure Laterality Date    BLADDER SUSPENSION      CATARACT REMOVAL      ENDOMETRIAL ABLATION      HYSTERECTOMY (CERVIX STATUS UNKNOWN)      JOINT REPLACEMENT  06/02/2020    right knee replacement    NERVE BLOCK      cervical nerve block       Medications Prior to Admission:   Prior to Admission medications    Medication Sig Start Date End Date Taking? Authorizing Provider   HYDROcodone-acetaminophen (NORCO) 5-325 MG per tablet Take 1 tablet by mouth every 4 hours as needed for Pain for up to 5 days. Intended supply: 5 days. Take lowest dose possible to manage pain Max Daily Amount: 6 tablets 3/21/24 3/26/24 Yes Freddy Choi PA-C   Magnesium 400 MG TABS Take 400 mg by mouth nightly 7/31/23  Yes Aleksandra Whitley MD   clonazePAM (KLONOPIN) 0.5 MG disintegrating tablet Take 1 tablet by mouth 3 times daily as needed. 10/25/23  Yes Aleksandra Whitley MD   celecoxib (CELEBREX) 100 MG capsule Take 1 capsule by mouth 2 times daily 1/29/24 4/28/24 Yes Aleksandra Whitley MD   doxycycline (VIBRAMYCIN) 50 MG capsule Take 30 mg by mouth daily  Patient not taking: Reported on 3/21/2024 3/31/15  Yes Aleksandra Whitley MD   pravastatin (PRAVACHOL) 40 MG tablet Take 1 tablet by mouth nightly 5/27/20  Yes Aleksandra Whitley MD   cholecalciferol (VITAMIN D3) 400 UNIT TABS tablet Take 1 tablet by mouth daily  Patient not taking: Reported on 3/21/2024   Yes Aleksandra Whitley MD   L-Lysine 1000 MG TABS Take 500 mg by mouth daily    Aleksandra Whitley MD

## 2024-03-21 NOTE — DISCHARGE SUMMARY
Orthopaedic Discharge Summary     Patient ID:  Ashley Burt  [id number]  68 y.o.  1955    Admit date: 3/21/2024    Discharge date and time: 3/22/2024    Admitting Physician: Manav Das DO     Discharge Physician: same    Admission Diagnoses: Closed fracture of distal end of left humerus, unspecified fracture morphology, initial encounter [T16.204P]    Discharge Diagnoses: Same    Admission Condition: Good    Discharged Condition: Good    Indication for Admission: Observation and pain control status post left distal humerus ORIF    Surgical procedure: Left distal humerus ORIF    Consults: None    Significant Diagnostic Studies: None    HPI:  Pain controlled.     Exam:  Vitals:  General: Alert and oriented, no acute distress  LUE: Splint clean dry and intact.RMU SILT. Ulnar motor intact but limited. RF/SF FDP firing, FDI minimal.    Hospital Course: Patient was admitted on 3/21/2024 status post left distal humerus ORIF.  Tolerated procedure well.  No adverse events overnight.  Pain well-controlled patient ready and stable for discharge home, 3/22/2024.    Disposition: Home    Patient Instructions:      Medication List        CHANGE how you take these medications      HYDROcodone-acetaminophen 5-325 MG per tablet  Commonly known as: Norco  Take 1 tablet by mouth every 4 hours as needed for Pain for up to 5 days. Intended supply: 5 days. Take lowest dose possible to manage pain Max Daily Amount: 6 tablets  What changed:   when to take this  reasons to take this  additional instructions            CONTINUE taking these medications      celecoxib 100 MG capsule  Commonly known as: CELEBREX     clonazePAM 0.5 MG disintegrating tablet  Commonly known as: KlonoPIN     fluticasone 50 MCG/ACT nasal spray  Commonly known as: FLONASE     L-Lysine 1000 MG Tabs     Magnesium 400 MG Tabs     pravastatin 40 MG tablet  Commonly known as: PRAVACHOL     therapeutic multivitamin-minerals tablet     traZODone 50 MG

## 2024-03-21 NOTE — PROGRESS NOTES
Pt admitted to [facility] room 7 and oriented to unit. SCD sleeves applied. Nares swabbed. Pt verbalized permission for first name, last initial and physicians name on white board. SDS board and discharge criteria explained, pt and family verbalized understanding. Pt denies thoughts of harming self or others. Call light in reach. Family at the bedside.  Vinicio [phone]

## 2024-03-22 VITALS
HEIGHT: 65 IN | HEART RATE: 73 BPM | WEIGHT: 161.6 LBS | OXYGEN SATURATION: 97 % | SYSTOLIC BLOOD PRESSURE: 117 MMHG | TEMPERATURE: 97.5 F | BODY MASS INDEX: 26.92 KG/M2 | RESPIRATION RATE: 16 BRPM | DIASTOLIC BLOOD PRESSURE: 53 MMHG

## 2024-03-22 LAB
ANION GAP SERPL CALC-SCNC: 13 MEQ/L (ref 8–16)
BASOPHILS ABSOLUTE: 0 THOU/MM3 (ref 0–0.1)
BASOPHILS NFR BLD AUTO: 0.1 %
BUN SERPL-MCNC: 15 MG/DL (ref 7–22)
CALCIUM SERPL-MCNC: 8.9 MG/DL (ref 8.5–10.5)
CHLORIDE SERPL-SCNC: 108 MEQ/L (ref 98–111)
CO2 SERPL-SCNC: 20 MEQ/L (ref 23–33)
CREAT SERPL-MCNC: 0.6 MG/DL (ref 0.4–1.2)
DEPRECATED RDW RBC AUTO: 46.3 FL (ref 35–45)
EOSINOPHIL NFR BLD AUTO: 0 %
EOSINOPHILS ABSOLUTE: 0 THOU/MM3 (ref 0–0.4)
ERYTHROCYTE [DISTWIDTH] IN BLOOD BY AUTOMATED COUNT: 12.8 % (ref 11.5–14.5)
GFR SERPL CREATININE-BSD FRML MDRD: > 60 ML/MIN/1.73M2
GLUCOSE SERPL-MCNC: 147 MG/DL (ref 70–108)
HCT VFR BLD AUTO: 33.9 % (ref 37–47)
HGB BLD-MCNC: 11.1 GM/DL (ref 12–16)
IMM GRANULOCYTES # BLD AUTO: 0.04 THOU/MM3 (ref 0–0.07)
IMM GRANULOCYTES NFR BLD AUTO: 0.3 %
LYMPHOCYTES ABSOLUTE: 1.2 THOU/MM3 (ref 1–4.8)
LYMPHOCYTES NFR BLD AUTO: 10.2 %
MCH RBC QN AUTO: 32.5 PG (ref 26–33)
MCHC RBC AUTO-ENTMCNC: 32.7 GM/DL (ref 32.2–35.5)
MCV RBC AUTO: 99.1 FL (ref 81–99)
MONOCYTES ABSOLUTE: 0.6 THOU/MM3 (ref 0.4–1.3)
MONOCYTES NFR BLD AUTO: 4.8 %
NEUTROPHILS NFR BLD AUTO: 84.6 %
NRBC BLD AUTO-RTO: 0 /100 WBC
PLATELET # BLD AUTO: 210 THOU/MM3 (ref 130–400)
PMV BLD AUTO: 10.4 FL (ref 9.4–12.4)
POTASSIUM SERPL-SCNC: 4.6 MEQ/L (ref 3.5–5.2)
RBC # BLD AUTO: 3.42 MILL/MM3 (ref 4.2–5.4)
SEGMENTED NEUTROPHILS ABSOLUTE COUNT: 10.1 THOU/MM3 (ref 1.8–7.7)
SODIUM SERPL-SCNC: 141 MEQ/L (ref 135–145)
WBC # BLD AUTO: 11.9 THOU/MM3 (ref 4.8–10.8)

## 2024-03-22 PROCEDURE — 6360000002 HC RX W HCPCS: Performed by: PHYSICIAN ASSISTANT

## 2024-03-22 PROCEDURE — 2580000003 HC RX 258: Performed by: PHYSICIAN ASSISTANT

## 2024-03-22 PROCEDURE — 36415 COLL VENOUS BLD VENIPUNCTURE: CPT

## 2024-03-22 PROCEDURE — 80048 BASIC METABOLIC PNL TOTAL CA: CPT

## 2024-03-22 PROCEDURE — 85025 COMPLETE CBC W/AUTO DIFF WBC: CPT

## 2024-03-22 PROCEDURE — 6370000000 HC RX 637 (ALT 250 FOR IP): Performed by: PHYSICIAN ASSISTANT

## 2024-03-22 RX ADMIN — Medication 1 TABLET: at 08:04

## 2024-03-22 RX ADMIN — WATER 2000 MG: 1 INJECTION INTRAMUSCULAR; INTRAVENOUS; SUBCUTANEOUS at 06:30

## 2024-03-22 RX ADMIN — HYDROCODONE BITARTRATE AND ACETAMINOPHEN 1 TABLET: 5; 325 TABLET ORAL at 08:01

## 2024-03-22 RX ADMIN — Medication 500 MG: at 08:03

## 2024-03-22 ASSESSMENT — PAIN SCALES - GENERAL
PAINLEVEL_OUTOF10: 4
PAINLEVEL_OUTOF10: 4

## 2024-03-22 NOTE — OP NOTE
[address] [address]                            OPERATIVE REPORT      PATIENT NAME: AMBROSE LEES               : 1955  MED REC NO: [id number]                       ROOM: [facility]  ACCOUNT NO: [id number]                       ADMIT DATE: 2024  PROVIDER: Manav Das DO      DATE OF PROCEDURE:  2024    SURGEON:  Manav Das DO    PREOPERATIVE DIAGNOSIS:  Left intra-articular distal humerus fracture.    POSTOPERATIVE DIAGNOSIS:  Left intra-articular distal humerus fracture.    OPERATION PERFORMED:  Open reduction and internal fixation of left intra-articular distal humerus fracture.    ASSISTANT:  Freddy Choi PA-C.  He assisted with positioning, retraction, closure, dressing, and splint application.    TYPE OF ANESTHESIA:  General and regional.    BLOOD LOSS:  300 mL.    IMPLANTS:  [employer] distal humerus plate with a direct medial and direct lateral plate with a combination of locking and nonlocking screws.    INDICATION FOR OPERATION:  The patient is a 68-year-old female who presented to my office after sustaining a fall.  This fall resulted in a distal humerus fracture.  This is a displaced intra-articular fracture.  I recommended surgical fixation.  The risks, benefits, and alternatives were reviewed.  The patient elected to proceed.    OPERATIVE SUMMARY:  The patient was met in the preoperative holding area, and the correct extremity was verified and marked.  Informed consent was obtained.  A nerve block was performed by the Anesthesia team.  The patient was escorted to the operative suite, and general anesthesia was administered.  She was then positioned in the beanbag lateral position.  The arm was over an arm perez.  She was prepped and draped in standard surgical fashion.  Time-out was taken.  The correct patient, procedure, operative site agreed upon by all who were present.  I  complications.  She should be admitted for observation overnight and plan to discharge her home on postoperative day 1.  She will follow up in 2 weeks for suture removal and begin gentle range of motion exercises.          JUDITH JAY DO      D:  03/21/2024 19:27:11     T:  03/22/2024 00:58:05     ALPHONSO/RAQUEL  Job #:  [id number]     Doc#:  [id number]

## 2024-03-22 NOTE — PLAN OF CARE
Problem: Discharge Planning  Goal: Discharge to home or other facility with appropriate resources  Outcome: Progressing  Flowsheets (Taken 3/21/2024 2310)  Discharge to home or other facility with appropriate resources:   Identify barriers to discharge with patient and caregiver   Identify discharge learning needs (meds, wound care, etc)   Refer to discharge planning if patient needs post-hospital services based on physician order or complex needs related to functional status, cognitive ability or social support system   Arrange for needed discharge resources and transportation as appropriate     Problem: Pain  Goal: Verbalizes/displays adequate comfort level or baseline comfort level  Outcome: Progressing  Flowsheets (Taken 3/21/2024 2310)  Verbalizes/displays adequate comfort level or baseline comfort level:   Administer analgesics based on type and severity of pain and evaluate response   Assess pain using appropriate pain scale   Implement non-pharmacological measures as appropriate and evaluate response   Encourage patient to monitor pain and request assistance     Problem: Safety - Adult  Goal: Free from fall injury  Outcome: Progressing  Flowsheets (Taken 3/21/2024 2310)  Free From Fall Injury:   Instruct family/caregiver on patient safety   Based on caregiver fall risk screen, instruct family/caregiver to ask for assistance with transferring infant if caregiver noted to have fall risk factors     Problem: ABCDS Injury Assessment  Goal: Absence of physical injury  Outcome: Progressing  Flowsheets (Taken 3/21/2024 2310)  Absence of Physical Injury: Implement safety measures based on patient assessment     Problem: Skin/Tissue Integrity - Adult  Goal: Incisions, wounds, or drain sites healing without S/S of infection  Outcome: Progressing  Flowsheets (Taken 3/21/2024 2310)  Incisions, Wounds, or Drain Sites Healing Without Sign and Symptoms of Infection:   ADMISSION and DAILY: Assess and document risk factors  for pressure ulcer development   TWICE DAILY: Assess and document skin integrity   TWICE DAILY: Assess and document dressing/incision, wound bed, drain sites and surrounding tissue     Problem: Musculoskeletal - Adult  Goal: Return mobility to safest level of function  Outcome: Progressing  Flowsheets (Taken 3/21/2024 2310)  Return Mobility to Safest Level of Function:   Assess patient stability and activity tolerance for standing, transferring and ambulating with or without assistive devices   Assist with transfers and ambulation using safe patient handling equipment as needed   Ensure adequate protection for wounds/incisions during mobilization   Obtain physical therapy/occupational therapy consults as needed   Instruct patient/family in ordered activity level  Goal: Maintain proper alignment of affected body part  Outcome: Progressing  Flowsheets (Taken 3/21/2024 2310)  Maintain proper alignment of affected body part:   Instruct and reinforce with patient and family use of appropriate assistive device and precautions (e.g. spinal or hip dislocation precautions)   Support and protect limb and body alignment per provider's orders  Goal: Return ADL status to a safe level of function  Outcome: Progressing  Flowsheets (Taken 3/21/2024 2310)  Return ADL Status to a Safe Level of Function:   Administer medication as ordered   Assess activities of daily living deficits and provide assistive devices as needed   Assist and instruct patient to increase activity and self care as tolerated   Obtain physical therapy/occupational therapy consults as needed     Problem: Infection - Adult  Goal: Absence of infection during hospitalization  Outcome: Progressing  Flowsheets (Taken 3/21/2024 2310)  Absence of infection during hospitalization:   Assess and monitor for signs and symptoms of infection   Monitor lab/diagnostic results   Monitor all insertion sites i.e., indwelling lines, tubes and drains   Monitor endotracheal (as able)

## (undated) DEVICE — SUTURE VCRL + SZ 2-0 L27IN ABSRB CLR CT-1 1/2 CIR TAPERCUT VCP259H

## (undated) DEVICE — SUTURE FIBERWIRE SZ 5 L38IN NONABSORBABLE BLU L48MM 1/2 AR7211

## (undated) DEVICE — SUTURE MCRYL + SZ 3-0 L27IN ABSRB UD PS1 L24MM 3/8 CIR REV MCP936H

## (undated) DEVICE — EVOS 4.0MM X 34MM OSTEOPENIA SCREW                                    T8 FULLY THREADED
Type: IMPLANTABLE DEVICE | Site: ARM | Status: NON-FUNCTIONAL
Brand: EVOS
Removed: 2024-03-21

## (undated) DEVICE — SUTURE VCRL + SZ 0 L27IN ABSRB UD CT-1 L36MM 1/2 CIR TAPR VCP260H

## (undated) DEVICE — LIQUIBAND RAPID ADHESIVE 36/CS 0.8ML: Brand: MEDLINE

## (undated) DEVICE — GLOVE SURG SZ 75 L12IN THK75MIL DK GRN LTX FREE

## (undated) DEVICE — BLADE ES L4IN INSUL EDGE

## (undated) DEVICE — SUTURE VCRL + 1 L27IN ABSRB UD CT-1 L36MM 1/2 CIR TAPR PNT VCP261H

## (undated) DEVICE — DRESSING ANITMICROBIAL FOAM OPTIFOAM POSTOP STRP 35 X 10 IN

## (undated) DEVICE — SUTURE NONABSORBABLE MONOFILAMENT 4-0 P-3 18 IN ETHILON 699H

## (undated) DEVICE — BANDAGE COMPR W4INXL12FT E DISP ESMARCH EVEN

## (undated) DEVICE — SPONGE LAP W18XL18IN WHT COT 4 PLY FLD STRUNG RADPQ DISP ST 2 PER PACK

## (undated) DEVICE — SUTURE VCRL + SZ 2-0 L27IN ABSRB UD CP-1 1/2 CIR REV CUT VCP266H

## (undated) DEVICE — GAUZE,SPONGE,8"X4",12PLY,XRAY,STRL,LF: Brand: MEDLINE

## (undated) DEVICE — PACK PROCEDURE SURG ORTH BASIC SRHP LF

## (undated) DEVICE — 60-7075-103 TRNQT,SPSB,PLC RED: Brand: MEDLINE RENEWAL

## (undated) DEVICE — EVOS SMALL 2.0MM DRILL W/AO QC LONG: Brand: EVOS

## (undated) DEVICE — EVOS 2.7MM X 40MM LOCKING SCREW T8 SELF-TAPPING
Type: IMPLANTABLE DEVICE | Site: ARM | Status: NON-FUNCTIONAL
Brand: EVOS
Removed: 2024-03-21

## (undated) DEVICE — EVOS 4.0MM X 24MM OSTEOPENIA SCREW                                    T8 FULLY THREADED
Type: IMPLANTABLE DEVICE | Site: ARM | Status: NON-FUNCTIONAL
Brand: EVOS

## (undated) DEVICE — 2.5MM PROVISIONAL FIXATION PIN - 14MM: Brand: EVOS

## (undated) DEVICE — 3M™ IOBAN™ 2 ANTIMICROBIAL INCISE DRAPE 6650EZ: Brand: IOBAN™ 2

## (undated) DEVICE — BANDAGE COMPR E SGL LAYERED CLSR BGE W/ CLP W4INXL15FT

## (undated) DEVICE — GLOVE ORANGE PI 7 1/2   MSG9075

## (undated) DEVICE — C-ARM: Brand: UNBRANDED

## (undated) DEVICE — DRAPE,U/SHT,SPLIT,FILM,60X84,STERILE: Brand: MEDLINE

## (undated) DEVICE — GLOVE ORANGE PI 8   MSG9080

## (undated) DEVICE — BAG,BANDED,W/RUBBERBAND,STERILE,30X36: Brand: MEDLINE

## (undated) DEVICE — DRAPE,U/ SHT,SPLIT,PLAS,STERIL: Brand: MEDLINE

## (undated) DEVICE — 3M™ STERI-DRAPE™ U-DRAPE 1015: Brand: STERI-DRAPE™

## (undated) DEVICE — LOOP VES W13MM THK09MM MINI RED SIL FLD REPELLENT

## (undated) DEVICE — EVOS SMALL 2.5MM DRILL W/AO QC LONG: Brand: EVOS